# Patient Record
Sex: FEMALE | Race: WHITE | NOT HISPANIC OR LATINO
[De-identification: names, ages, dates, MRNs, and addresses within clinical notes are randomized per-mention and may not be internally consistent; named-entity substitution may affect disease eponyms.]

---
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
## 2023-03-08 ENCOUNTER — APPOINTMENT (OUTPATIENT)
Dept: PSYCHIATRY | Facility: CLINIC | Age: 18
End: 2023-03-08
Payer: COMMERCIAL

## 2023-03-08 PROCEDURE — 99214 OFFICE O/P EST MOD 30 MIN: CPT | Mod: 95

## 2023-03-08 NOTE — PAST MEDICAL HISTORY
[FreeTextEntry1] : Patient was recently started on Lexapro 10mg po daily. Titrated to 20mg after last visit. Currently doing well. Patient now sees therapist biweekly. \par \par Abilify - stomach issues?

## 2023-03-08 NOTE — PLAN
[FreeTextEntry5] : -counseling and support provided\par -continue with Lexapro 20mg po daily. -\par -continue hydroxyzine 25-50mg po prn for panic or insomnia \par -ER/911 PRN \par -f/u in 12 weeks

## 2023-03-08 NOTE — HISTORY OF PRESENT ILLNESS
[FreeTextEntry1] : Interval History: Patient asks to be called Yannick and addressed with "he" pronouns.\par \par Since last visit, pt was continued on lexaPRO and on trazodone for sleep. Today, pt is back at school. He states he is doing really well. He is happy with his lexapro and how he feels on it. He is happy and does better when he is at school. He is worried for the summer but he is looking for something to keeo busy with. \par \par He denies any si/hi. No current active manic symptoms. No side effects to meds\par \par Risk Assessment: Low risk for suicide/ aggression both acutely and chronically. PROTECTIVE Factors: no previous attempt, no access to lethal means/ no access to firearm, no substance abuse, no legal history, no history of aggression, does not present with vindictive intent, no SI/HI, no psychosis, positive therapeutic relationship, engaged in school/work, enrolled in treatment reality testing intact, good social support, future oriented, no previous suicide attempts or violent history. \par \par  [Other Location: e.g. School (Enter Location, City,State)___] : at [unfilled], at the time of the visit. [Medical Office: ([facility])___] : at the medical office located in  [Verbal consent obtained from patient] : the patient, [unfilled]

## 2023-03-08 NOTE — SOCIAL HISTORY
[With Family] : lives with family [Unemployed] : unemployed [Never ] : never  [High School] : high school [FreeTextEntry1] : biological parents. older sister recently moved out to start college  [FreeTextEntry2] : student  [FreeTextEntry3] : single

## 2023-03-08 NOTE — FAMILY HISTORY
[FreeTextEntry1] : Father suffered from an episode of depression when he was younger.\par Mother is diagnosed with anxiety and is on medication however name unknown\par No family history of suicide\par

## 2023-09-26 ENCOUNTER — APPOINTMENT (OUTPATIENT)
Dept: PSYCHIATRY | Facility: CLINIC | Age: 18
End: 2023-09-26

## 2023-12-19 ENCOUNTER — APPOINTMENT (OUTPATIENT)
Dept: PSYCHIATRY | Facility: CLINIC | Age: 18
End: 2023-12-19

## 2024-01-02 ENCOUNTER — APPOINTMENT (OUTPATIENT)
Dept: PSYCHIATRY | Facility: CLINIC | Age: 19
End: 2024-01-02
Payer: COMMERCIAL

## 2024-01-02 DIAGNOSIS — F41.1 GENERALIZED ANXIETY DISORDER: ICD-10-CM

## 2024-01-02 DIAGNOSIS — F42.2 MIXED OBSESSIONAL THOUGHTS AND ACTS: ICD-10-CM

## 2024-01-02 DIAGNOSIS — F64.0 TRANSSEXUALISM: ICD-10-CM

## 2024-01-02 PROCEDURE — 99214 OFFICE O/P EST MOD 30 MIN: CPT

## 2024-01-02 RX ORDER — ESCITALOPRAM OXALATE 20 MG/1
20 TABLET ORAL
Qty: 1 | Refills: 0 | Status: ACTIVE | COMMUNITY
Start: 2019-10-30 | End: 1900-01-01

## 2024-01-02 RX ORDER — ARIPIPRAZOLE 2 MG/1
2 TABLET ORAL DAILY
Qty: 90 | Refills: 0 | Status: DISCONTINUED | COMMUNITY
Start: 2021-04-08 | End: 2024-01-02

## 2024-01-02 RX ORDER — TRAZODONE HYDROCHLORIDE 100 MG/1
100 TABLET ORAL
Qty: 180 | Refills: 0 | Status: ACTIVE | COMMUNITY
Start: 2022-06-01 | End: 1900-01-01

## 2024-01-02 NOTE — PLAN
[FreeTextEntry5] : -counseling and support provided -continue with Lexapro 20mg po daily. -compliance encouraged -continue hydroxyzine 25-50mg po prn for panic or insomnia -continue trazodone to 100-200mg po prn qhs for sleep. , pt has been taking 150mg qhs prn -ER/911 PRN -f/u in 12 weeks

## 2024-01-02 NOTE — HISTORY OF PRESENT ILLNESS
[FreeTextEntry1] : Interval History: Patient asks to be called Yannick and addressed with "he" pronouns.  Pt has not been seen since March 23. Today, pt presents euthymic. He states he is doing really well. His semester went well except for [place] which he may need to retake. He is happy to be back home for break. He is very good about taking his Lexapro. He states it helps. Same for trazodone to aid his sleep. He never started the Seroquel and he is fine without it.   He denies any si/hi. No current active manic symptoms. No side effects to meds  Risk Assessment: Low risk for suicide/ aggression both acutely and chronically. PROTECTIVE Factors: no previous attempt, no access to lethal means/ no access to firearm, no substance abuse, no legal history, no history of aggression, does not present with vindictive intent, no SI/HI, no psychosis, positive therapeutic relationship, engaged in school/work, enrolled in treatment reality testing intact, good social support, future oriented, no previous suicide attempts or violent history.

## 2024-04-02 ENCOUNTER — APPOINTMENT (OUTPATIENT)
Dept: PSYCHIATRY | Facility: CLINIC | Age: 19
End: 2024-04-02

## 2024-07-10 ENCOUNTER — APPOINTMENT (OUTPATIENT)
Dept: CARDIOLOGY | Facility: CLINIC | Age: 19
End: 2024-07-10

## 2024-07-10 ENCOUNTER — APPOINTMENT (OUTPATIENT)
Dept: CARDIOLOGY | Facility: CLINIC | Age: 19
End: 2024-07-10
Payer: COMMERCIAL

## 2024-07-10 ENCOUNTER — NON-APPOINTMENT (OUTPATIENT)
Age: 19
End: 2024-07-10

## 2024-07-10 VITALS
DIASTOLIC BLOOD PRESSURE: 86 MMHG | HEART RATE: 80 BPM | HEIGHT: 62 IN | OXYGEN SATURATION: 100 % | BODY MASS INDEX: 21.16 KG/M2 | RESPIRATION RATE: 20 BRPM | SYSTOLIC BLOOD PRESSURE: 136 MMHG | WEIGHT: 115 LBS

## 2024-07-10 VITALS — DIASTOLIC BLOOD PRESSURE: 80 MMHG | HEART RATE: 72 BPM | SYSTOLIC BLOOD PRESSURE: 110 MMHG

## 2024-07-10 DIAGNOSIS — I47.10 SUPRAVENTRICULAR TACHYCARDIA, UNSPECIFIED: ICD-10-CM

## 2024-07-10 DIAGNOSIS — R00.2 PALPITATIONS: ICD-10-CM

## 2024-07-10 PROCEDURE — 93246 EXT ECG>7D<15D RECORDING: CPT

## 2024-07-10 PROCEDURE — 99204 OFFICE O/P NEW MOD 45 MIN: CPT

## 2024-07-10 PROCEDURE — 93000 ELECTROCARDIOGRAM COMPLETE: CPT

## 2024-07-24 ENCOUNTER — NON-APPOINTMENT (OUTPATIENT)
Age: 19
End: 2024-07-24

## 2024-07-24 ENCOUNTER — APPOINTMENT (OUTPATIENT)
Dept: CARDIOLOGY | Facility: CLINIC | Age: 19
End: 2024-07-24
Payer: COMMERCIAL

## 2024-07-24 VITALS — DIASTOLIC BLOOD PRESSURE: 60 MMHG | HEART RATE: 72 BPM | SYSTOLIC BLOOD PRESSURE: 130 MMHG

## 2024-07-24 VITALS
SYSTOLIC BLOOD PRESSURE: 127 MMHG | HEIGHT: 62 IN | BODY MASS INDEX: 21.16 KG/M2 | DIASTOLIC BLOOD PRESSURE: 78 MMHG | HEART RATE: 70 BPM | WEIGHT: 115 LBS | OXYGEN SATURATION: 100 %

## 2024-07-24 DIAGNOSIS — I49.1 ATRIAL PREMATURE DEPOLARIZATION: ICD-10-CM

## 2024-07-24 DIAGNOSIS — I49.3 VENTRICULAR PREMATURE DEPOLARIZATION: ICD-10-CM

## 2024-07-24 DIAGNOSIS — I47.10 SUPRAVENTRICULAR TACHYCARDIA, UNSPECIFIED: ICD-10-CM

## 2024-07-24 PROCEDURE — 99214 OFFICE O/P EST MOD 30 MIN: CPT

## 2024-07-24 PROCEDURE — 93000 ELECTROCARDIOGRAM COMPLETE: CPT

## 2024-07-24 NOTE — ASSESSMENT
[FreeTextEntry1] : Impression: 1.  Patient with very early in life history of SVT Numeral 2.  Rare APCs and PVCs at this time and generally asymptomatic  Plan: 1.  Echocardiogram to assess for any underlying myopathic process that would make these ventricular bigeminy more potentially dangerous

## 2024-07-24 NOTE — REASON FOR VISIT
[Arrhythmia/ECG Abnorrmalities] : arrhythmia/ECG abnormalities [FreeTextEntry1] : Patient returns for follow-up.  He seems to be feeling much better at this time.  And has had very rare palpitations.  Review of his loop recorder revealed 1 episode of ventricular bigeminy the entire episode lasting for 6.3 seconds and a very rare APC.  He has not yet gone for his echocardiogram

## 2024-08-06 ENCOUNTER — APPOINTMENT (OUTPATIENT)
Dept: CARDIOLOGY | Facility: CLINIC | Age: 19
End: 2024-08-06

## 2024-11-06 ENCOUNTER — APPOINTMENT (OUTPATIENT)
Dept: PSYCHIATRY | Facility: CLINIC | Age: 19
End: 2024-11-06

## 2024-11-27 ENCOUNTER — APPOINTMENT (OUTPATIENT)
Dept: PSYCHIATRY | Facility: CLINIC | Age: 19
End: 2024-11-27
Payer: COMMERCIAL

## 2024-11-27 DIAGNOSIS — F64.0 TRANSSEXUALISM: ICD-10-CM

## 2024-11-27 DIAGNOSIS — F41.1 GENERALIZED ANXIETY DISORDER: ICD-10-CM

## 2024-11-27 DIAGNOSIS — F42.2 MIXED OBSESSIONAL THOUGHTS AND ACTS: ICD-10-CM

## 2024-11-27 PROCEDURE — 99214 OFFICE O/P EST MOD 30 MIN: CPT | Mod: 95

## 2025-05-23 ENCOUNTER — NON-APPOINTMENT (OUTPATIENT)
Age: 20
End: 2025-05-23

## 2025-05-27 ENCOUNTER — APPOINTMENT (OUTPATIENT)
Dept: PSYCHIATRY | Facility: CLINIC | Age: 20
End: 2025-05-27

## 2025-06-24 ENCOUNTER — APPOINTMENT (OUTPATIENT)
Dept: PSYCHIATRY | Facility: CLINIC | Age: 20
End: 2025-06-24
Payer: COMMERCIAL

## 2025-06-24 PROCEDURE — 99214 OFFICE O/P EST MOD 30 MIN: CPT

## 2025-06-24 PROCEDURE — G2211 COMPLEX E/M VISIT ADD ON: CPT | Mod: NC

## 2025-09-16 ENCOUNTER — APPOINTMENT (OUTPATIENT)
Dept: PSYCHIATRY | Facility: CLINIC | Age: 20
End: 2025-09-16
Payer: COMMERCIAL

## 2025-09-16 DIAGNOSIS — F41.1 GENERALIZED ANXIETY DISORDER: ICD-10-CM

## 2025-09-16 DIAGNOSIS — F42.2 MIXED OBSESSIONAL THOUGHTS AND ACTS: ICD-10-CM

## 2025-09-16 DIAGNOSIS — F64.0 TRANSSEXUALISM: ICD-10-CM

## 2025-09-16 PROCEDURE — G2211 COMPLEX E/M VISIT ADD ON: CPT | Mod: NC,95

## 2025-09-16 PROCEDURE — 99214 OFFICE O/P EST MOD 30 MIN: CPT | Mod: 95
